# Patient Record
Sex: FEMALE | Race: WHITE | Employment: UNEMPLOYED | URBAN - METROPOLITAN AREA
[De-identification: names, ages, dates, MRNs, and addresses within clinical notes are randomized per-mention and may not be internally consistent; named-entity substitution may affect disease eponyms.]

---

## 2017-06-19 ENCOUNTER — OFFICE VISIT (OUTPATIENT)
Dept: FAMILY MEDICINE | Facility: CLINIC | Age: 27
End: 2017-06-19
Payer: COMMERCIAL

## 2017-06-19 VITALS
TEMPERATURE: 99.1 F | BODY MASS INDEX: 30.95 KG/M2 | DIASTOLIC BLOOD PRESSURE: 66 MMHG | HEIGHT: 65 IN | HEART RATE: 49 BPM | OXYGEN SATURATION: 100 % | SYSTOLIC BLOOD PRESSURE: 98 MMHG | WEIGHT: 185.75 LBS | RESPIRATION RATE: 16 BRPM

## 2017-06-19 DIAGNOSIS — M79.674 PAIN OF TOE OF RIGHT FOOT: ICD-10-CM

## 2017-06-19 DIAGNOSIS — R19.5 NONSPECIFIC ABNORMAL FINDING IN STOOL CONTENTS: ICD-10-CM

## 2017-06-19 DIAGNOSIS — J45.990 EXERCISE-INDUCED ASTHMA: ICD-10-CM

## 2017-06-19 DIAGNOSIS — Z13.0 SCREENING, ANEMIA, DEFICIENCY, IRON: ICD-10-CM

## 2017-06-19 DIAGNOSIS — Z01.419 WELL WOMAN EXAM WITH ROUTINE GYNECOLOGICAL EXAM: Primary | ICD-10-CM

## 2017-06-19 DIAGNOSIS — L67.8 ABNORMAL FACIAL HAIR: ICD-10-CM

## 2017-06-19 DIAGNOSIS — Z87.828 HISTORY OF SPRAIN OF ANKLE: ICD-10-CM

## 2017-06-19 DIAGNOSIS — Z13.6 ENCOUNTER FOR SCREENING FOR CARDIOVASCULAR DISORDERS: ICD-10-CM

## 2017-06-19 DIAGNOSIS — Z13.1 SCREENING FOR DIABETES MELLITUS: ICD-10-CM

## 2017-06-19 LAB
ALBUMIN SERPL-MCNC: 3.9 G/DL (ref 3.4–5)
ALP SERPL-CCNC: 58 U/L (ref 40–150)
ALT SERPL W P-5'-P-CCNC: 21 U/L (ref 0–50)
ANION GAP SERPL CALCULATED.3IONS-SCNC: 9 MMOL/L (ref 3–14)
AST SERPL W P-5'-P-CCNC: 16 U/L (ref 0–45)
BASOPHILS # BLD AUTO: 0 10E9/L (ref 0–0.2)
BASOPHILS NFR BLD AUTO: 0.4 %
BILIRUB SERPL-MCNC: 0.3 MG/DL (ref 0.2–1.3)
BUN SERPL-MCNC: 8 MG/DL (ref 7–30)
CALCIUM SERPL-MCNC: 9.2 MG/DL (ref 8.5–10.1)
CHLORIDE SERPL-SCNC: 107 MMOL/L (ref 94–109)
CHOLEST SERPL-MCNC: 208 MG/DL
CO2 SERPL-SCNC: 25 MMOL/L (ref 20–32)
CREAT SERPL-MCNC: 0.7 MG/DL (ref 0.52–1.04)
DIFFERENTIAL METHOD BLD: ABNORMAL
EOSINOPHIL # BLD AUTO: 0 10E9/L (ref 0–0.7)
EOSINOPHIL NFR BLD AUTO: 0.6 %
ERYTHROCYTE [DISTWIDTH] IN BLOOD BY AUTOMATED COUNT: 15.8 % (ref 10–15)
GFR SERPL CREATININE-BSD FRML MDRD: NORMAL ML/MIN/1.7M2
GLUCOSE SERPL-MCNC: 84 MG/DL (ref 70–99)
HCT VFR BLD AUTO: 39 % (ref 35–47)
HDLC SERPL-MCNC: 63 MG/DL
HGB BLD-MCNC: 12.6 G/DL (ref 11.7–15.7)
LDLC SERPL CALC-MCNC: 126 MG/DL
LYMPHOCYTES # BLD AUTO: 1.5 10E9/L (ref 0.8–5.3)
LYMPHOCYTES NFR BLD AUTO: 30.3 %
MCH RBC QN AUTO: 27.1 PG (ref 26.5–33)
MCHC RBC AUTO-ENTMCNC: 32.3 G/DL (ref 31.5–36.5)
MCV RBC AUTO: 84 FL (ref 78–100)
MONOCYTES # BLD AUTO: 0.5 10E9/L (ref 0–1.3)
MONOCYTES NFR BLD AUTO: 10.1 %
NEUTROPHILS # BLD AUTO: 2.8 10E9/L (ref 1.6–8.3)
NEUTROPHILS NFR BLD AUTO: 58.6 %
NONHDLC SERPL-MCNC: 145 MG/DL
PLATELET # BLD AUTO: 230 10E9/L (ref 150–450)
POTASSIUM SERPL-SCNC: 4.5 MMOL/L (ref 3.4–5.3)
PROT SERPL-MCNC: 7.1 G/DL (ref 6.8–8.8)
RBC # BLD AUTO: 4.65 10E12/L (ref 3.8–5.2)
SODIUM SERPL-SCNC: 141 MMOL/L (ref 133–144)
TRIGL SERPL-MCNC: 95 MG/DL
TSH SERPL DL<=0.005 MIU/L-ACNC: 1.08 MU/L (ref 0.4–4)
WBC # BLD AUTO: 4.9 10E9/L (ref 4–11)

## 2017-06-19 PROCEDURE — 99212 OFFICE O/P EST SF 10 MIN: CPT | Mod: 25 | Performed by: FAMILY MEDICINE

## 2017-06-19 PROCEDURE — 80061 LIPID PANEL: CPT | Performed by: FAMILY MEDICINE

## 2017-06-19 PROCEDURE — G0145 SCR C/V CYTO,THINLAYER,RESCR: HCPCS | Performed by: FAMILY MEDICINE

## 2017-06-19 PROCEDURE — 99385 PREV VISIT NEW AGE 18-39: CPT | Performed by: FAMILY MEDICINE

## 2017-06-19 PROCEDURE — 36415 COLL VENOUS BLD VENIPUNCTURE: CPT | Performed by: FAMILY MEDICINE

## 2017-06-19 PROCEDURE — 80050 GENERAL HEALTH PANEL: CPT | Performed by: FAMILY MEDICINE

## 2017-06-19 RX ORDER — ALBUTEROL SULFATE 90 UG/1
2 AEROSOL, METERED RESPIRATORY (INHALATION) EVERY 6 HOURS
COMMUNITY

## 2017-06-19 NOTE — PATIENT INSTRUCTIONS
"Breast exam done today   Pelvic pap  done , if normal recheck in 3 years  Labs today   Sign a release so we can update records  Will review what you have on file so far   If need albuterol refilled give us a call  Ice , rest right big toe, use stiff soled shoes, see podiatry if not better  Do stool test , if recurs or positive consider GI consult  No hemorrhoids seen  referral for Pt/ chiro for ankle sprain hx  Refilled vaniqa if not helping recommend to see dermatology   Recommended low salt diet, wt loss, exercise.   Eating a healthy diet can improve your health by reducing your risk for heart and vascular disease.  It can help you maintain a healthy weight which in turn decreases your risk for additional problems including diabetes and arthritis.  Eating well can improve your concentration and memory.  You'll also feel better.  Follow the advice of author Omar Alejandra (In Defense of Food): \"Eat food.  Not too much.  Mostly plants.\"  Or follow the advice:  \"If it came from a plant, eat it.  If it was processed in a plant, don't.\"    A heart-healthy, Mediterranean Diet is low in saturated fat and includes the following:  Fruits and vegetables (fresh or frozen - especially berries and cruciferous vegetables)  Whole grains and legumes (whole grain bread, whole grain pasta, whole grain cereal, and brown rice)  Healthy fats:  Olive oil and Canola oil  Nuts (especially walnuts and almonds)  Fish  Avocado  Soy  Garlic  Dark chocolate    Foods to limit or avoid include:  Animal products and animal fats  Saturated fat (especially fried foods and trans fats)  Refined carbohydrates (white flour, white bread, white pasta, sugar, and white rice)  Red meat  Processed meat  Processed food including fast food    OBIE SIGNUP FOR E-VISITS AND EASIER COMMUNICATION:  [email or web address]     Zipnosis:  [employer].  Sign up for e-visits for common illnesses!     RADIOLOGY:   [facility]:  [phone] " to schedule any radiology tests at [facility]: [phone]    Mammograms/colonoscopies:  [phone]    CONSUMER PRICE LINE for estimates of test costs:  [phone]     You can also call [phone] if you are uninsured or underinsured to see if you qualify for a reduced cost mammogram or colonoscopy.     Please consider using [facility] for better service and improved communication with your physician!       Preventive Health Recommendations  Female Ages 26 - 39  Yearly exam:   See your health care provider every year in order to    Review health changes.     Discuss preventive care.      Review your medicines if you your doctor has prescribed any.    Until age 30: Get a Pap test every three years (more often if you have had an abnormal result).    After age 30: Talk to your doctor about whether you should have a Pap test every 3 years or have a Pap test with HPV screening every 5 years.   You do not need a Pap test if your uterus was removed (hysterectomy) and you have not had cancer.  You should be tested each year for STDs (sexually transmitted diseases), if you're at risk.   Talk to your provider about how often to have your cholesterol checked.  If you are at risk for diabetes, you should have a diabetes test (fasting glucose).  Shots: Get a flu shot each year. Get a tetanus shot every 10 years.   Nutrition:     Eat at least 5 servings of fruits and vegetables each day.    Eat whole-grain bread, whole-wheat pasta and brown rice instead of white grains and rice.    Talk to your provider about Calcium and Vitamin D.     Lifestyle    Exercise at least 150 minutes a week (30 minutes a day, 5 days of the week). This will help you control your weight and prevent disease.    Limit alcohol to one drink per day.    No smoking.     Wear sunscreen to prevent skin cancer.    See your dentist every six months for an exam and cleaning.

## 2017-06-19 NOTE — NURSING NOTE
"Chief Complaint   Patient presents with     Physical     Initial BP 98/66  Pulse (!) 49  Temp 99.1  F (37.3  C) (Oral)  Resp 16  Ht 5' 5\" (1.651 m)  Wt 185 lb 12 oz (84.3 kg)  LMP 05/29/2017 (Exact Date)  SpO2 100%  BMI 30.91 kg/m2 Estimated body mass index is 30.91 kg/(m^2) as calculated from the following:    Height as of this encounter: 5' 5\" (1.651 m).    Weight as of this encounter: 185 lb 12 oz (84.3 kg)..  BP completed using cuff size: ameena Burnett MA   "

## 2017-06-19 NOTE — LETTER
June 30, 2017      Svetlana Weiss  [address] [facility] [address]    Dear ,      I am happy to inform you that your recent cervical cancer screening test (PAP smear) was normal.      Preventative screenings such as this help to ensure your health for years to come. You should repeat a pap smear in 3 years, unless otherwise directed.      You will still need to return to the clinic every year for your annual exam and other preventive tests.     Please contact the clinic at [phone] if you have further questions.       Sincerely,      Cyn Lisa MD/[facility]

## 2017-06-19 NOTE — LETTER
[facility]   [address] [place]   [address]  [phone]    June 20, 2017      Svetlana Weiss  [address] [facility] [address]              Dear Ms. Weiss,    Results within acceptable limits.  -Liver and gallbladder tests are normal. (ALT,AST, Alk phos, bilirubin), kidney function is normal (Cr, GFR), Sodium is normal, Potassium is normal, Calcium is normal, Glucose is normal (diabetes screening test).   -Cholesterol levels (LDL,HDL, Triglycerides) are normal.  ADVISE: rechecking in 1 year.  -TSH (thyroid stimulating hormone) level is normal which indicates normal thyroid function..    Results for orders placed or performed in visit on 06/19/17   CBC with platelets differential   Result Value Ref Range    WBC 4.9 4.0 - 11.0 10e9/L    RBC Count 4.65 3.8 - 5.2 10e12/L    Hemoglobin 12.6 11.7 - 15.7 g/dL    Hematocrit 39.0 35.0 - 47.0 %    MCV 84 78 - 100 fl    MCH 27.1 26.5 - 33.0 pg    MCHC 32.3 31.5 - 36.5 g/dL    RDW 15.8 (H) 10.0 - 15.0 %    Platelet Count 230 150 - 450 10e9/L    Diff Method Automated Method     % Neutrophils 58.6 %    % Lymphocytes 30.3 %    % Monocytes 10.1 %    % Eosinophils 0.6 %    % Basophils 0.4 %    Absolute Neutrophil 2.8 1.6 - 8.3 10e9/L    Absolute Lymphocytes 1.5 0.8 - 5.3 10e9/L    Absolute Monocytes 0.5 0.0 - 1.3 10e9/L    Absolute Eosinophils 0.0 0.0 - 0.7 10e9/L    Absolute Basophils 0.0 0.0 - 0.2 10e9/L   Comprehensive metabolic panel   Result Value Ref Range    Sodium 141 133 - 144 mmol/L    Potassium 4.5 3.4 - 5.3 mmol/L    Chloride 107 94 - 109 mmol/L    Carbon Dioxide 25 20 - 32 mmol/L    Anion Gap 9 3 - 14 mmol/L    Glucose 84 70 - 99 mg/dL    Urea Nitrogen 8 7 - 30 mg/dL    Creatinine 0.70 0.52 - 1.04 mg/dL    GFR Estimate >90  Non  GFR Calc   >60 mL/min/1.7m2    GFR Estimate If Black >90   GFR Calc   >60 mL/min/1.7m2    Calcium 9.2 8.5 - 10.1 mg/dL    Bilirubin Total 0.3 0.2 - 1.3 mg/dL    Albumin 3.9  3.4 - 5.0 g/dL    Protein Total 7.1 6.8 - 8.8 g/dL    Alkaline Phosphatase 58 40 - 150 U/L    ALT 21 0 - 50 U/L    AST 16 0 - 45 U/L   Lipid panel reflex to direct LDL   Result Value Ref Range    Cholesterol 208 (H) <200 mg/dL    Triglycerides 95 <150 mg/dL    HDL Cholesterol 63 >49 mg/dL    LDL Cholesterol Calculated 126 (H) <100 mg/dL    Non HDL Cholesterol 145 (H) <130 mg/dL   TSH with free T4 reflex   Result Value Ref Range    TSH 1.08 0.40 - 4.00 mU/L         Sincerely,    Cyn Lisa MD/nr

## 2017-06-19 NOTE — PROGRESS NOTES
Results within acceptable limits.  -Normal red blood cell (hgb) levels, normal white blood cell count and normal platelet levels. Rest pending .

## 2017-06-19 NOTE — MR AVS SNAPSHOT
"              After Visit Summary   6/19/2017    Svetlana Weiss    MRN: [id number]           Patient Information     Date Of Birth          1990        Visit Information        Provider Department      6/19/2017 8:00 AM Cyn Lisa MD [facility]        Today's Diagnoses     Well woman exam with routine gynecological exam    -  1    Screening, anemia, deficiency, iron        Screening for diabetes mellitus        Encounter for screening for cardiovascular disorders        BMI 30.0-30.9,adult        Pain of toe of right foot        Nonspecific abnormal finding in stool contents        History of sprain of ankle        Abnormal facial hair          Care Instructions    Breast exam done today   Pelvic pap  done , if normal recheck in 3 years  Labs today   Sign a release so we can update records  Will review what you have on file so far   If need albuterol refilled give us a call  Ice , rest right big toe, use stiff soled shoes, see podiatry if not better  Do stool test , if recurs or positive consider GI consult  No hemorrhoids seen  referral for Pt/ chiro for ankle sprain hx  Refilled vaniqa if not helping recommend to see dermatology   Recommended low salt diet, wt loss, exercise.   Eating a healthy diet can improve your health by reducing your risk for heart and vascular disease.  It can help you maintain a healthy weight which in turn decreases your risk for additional problems including diabetes and arthritis.  Eating well can improve your concentration and memory.  You'll also feel better.  Follow the advice of author Omar Alejandra (In Defense of Food): \"Eat food.  Not too much.  Mostly plants.\"  Or follow the advice:  \"If it came from a plant, eat it.  If it was processed in a plant, don't.\"    A heart-healthy, Mediterranean Diet is low in saturated fat and includes the following:  Fruits and vegetables (fresh or frozen - especially berries and cruciferous vegetables)  Whole grains and " legumes (whole grain bread, whole grain pasta, whole grain cereal, and brown rice)  Healthy fats:  Olive oil and Canola oil  Nuts (especially walnuts and almonds)  Fish  Avocado  Soy  Garlic  Dark chocolate    Foods to limit or avoid include:  Animal products and animal fats  Saturated fat (especially fried foods and trans fats)  Refined carbohydrates (white flour, white bread, white pasta, sugar, and white rice)  Red meat  Processed meat  Processed food including fast food    JUAREZHART SIGNUP FOR E-VISITS AND EASIER COMMUNICATION:  [email or web address]     Zipnosis:  [employer].  Sign up for e-visits for common illnesses!     RADIOLOGY:   [facility]:  [phone] to schedule [facility] radi[facility] tests at [facility]: [phone]    Mammograms/colonoscopies:  [phone]    CONSUMER PRICE LINE for estimates of test costs:  [phone]     You can also call [phone] if you are uninsured or underinsured to see if you qualify for a reduced cost mammogram or colonoscopy.     Please consider using [facility] for better service and improved communication with your physician!       Preventive Health Recommendations  Female Ages 26 - 39  Yearly exam:   See your health care provider every year in order to    Review health changes.     Discuss preventive care.      Review your medicines if you your doctor has prescribed any.    Until age 30: Get a Pap test every three years (more often if you have had an abnormal result).    After age 30: Talk to your doctor about whether you should have a Pap test every 3 years or have a Pap test with HPV screening every 5 years.   You do not need a Pap test if your uterus was removed (hysterectomy) and you have not had cancer.  You should be tested each year for STDs (sexually transmitted diseases), if you're at risk.   Talk to your provider about how often to have your cholesterol checked.  If you are at risk for diabetes, you should have  a diabetes test (fasting glucose).  Shots: Get a flu shot each year. Get a tetanus shot every 10 years.   Nutrition:     Eat at least 5 servings of fruits and vegetables each day.    Eat whole-grain bread, whole-wheat pasta and brown rice instead of white grains and rice.    Talk to your provider about Calcium and Vitamin D.     Lifestyle    Exercise at least 150 minutes a week (30 minutes a day, 5 days of the week). This will help you control your weight and prevent disease.    Limit alcohol to one drink per day.    No smoking.     Wear sunscreen to prevent skin cancer.    See your dentist every six months for an exam and cleaning.            Follow-ups after your visit        Additional Services     [facility] PT, HAND, AND CHIROPRACTIC REFERRAL       **This order will print in the [facility] Scheduling Office**    Physical Therapy, Hand Therapy and Chiropractic Care are available through:    *[facility]  *[facility]  *[facility]    Call one number to schedule at any of the above locations: [phone].    Your provider has referred you to: [facility] - PT and/or Chiropractic Care determined by clinical presentation at [facility] or [facility] Initial Visit    Indication/Reason for Referral: Ankle Pain  Onset of Illness: hx of recurrent ankle sprains.   Therapy Orders: Evaluate and Treat  Special Programs: None  Special Request: None    Betty Hernández      Additional Comments for the Therapist or Chiropractor:     Please be aware that coverage of these services is subject to the terms and limitations of your health insurance plan.  Call member services at your health plan with any benefit or coverage questions.      Please bring the following to your appointment:    *Your personal calendar for scheduling future appointments  *Comfortable clothing                  Future tests that were ordered for you today     Open Future Orders        Priority Expected Expires  "Ordered    Fecal colorectal cancer screen (FIT) Routine 7/10/2017 2017 2017            Who to contact     If you have questions or need follow up information about today's clinic visit or your schedule please contact [facility] directly at [phone].  Normal or non-critical lab and imaging results will be communicated to you by MyChart, letter or phone within 4 business days after the clinic has received the results. If you do not hear from us within 7 days, please contact the clinic through MyChart or phone. If you have a critical or abnormal lab result, we will notify you by phone as soon as possible.  Submit refill requests through Tr[employer]o or call your pharmacy and they will forward the refill request to us. Please allow 3 business days for your refill to be completed.          Additional Information About Your Visit        MyChart Information     Tr[employer]o lets you send messages to your doctor, view your test results, renew your prescriptions, schedule appointments and more. To sign up, go to [email or web address] . Click on \"Log in\" on the left side of the screen, which will take you to the Welcome page. Then click on \"Sign up Now\" on the right side of the page.     You will be asked to enter the access code listed below, as well as some personal information. Please follow the directions to create your username and password.     Your access code is: [email or web address]  Expires: 2017  8:42 AM     Your access code will  in 90 days. If you need help or a new code, please call your [place] clinic or [phone].        Care EveryWhere ID     This is your Care EveryWhere ID. This could be used by other organizations to access your [place] medical records  [id number]        Your Vitals Were     Pulse Temperature Respirations Height Last Period Pulse Oximetry    49 99.1  F (37.3  C) (Oral) 16 5' 5\" (1.651 m) 2017 (Exact Date) 100%    BMI (Body Mass Index)                   " 30.91 kg/m2            Blood Pressure from Last 3 Encounters:   06/19/17 98/66    Weight from Last 3 Encounters:   06/19/17 185 lb 12 oz (84.3 kg)              We Performed the Following     CBC with platelets differential     Comprehensive metabolic panel     [facility] PT, HAND, AND CHIROPRACTIC REFERRAL     Lipid panel reflex to direct LDL     Pap imaged thin layer screen reflex to HPV if ASCUS - recommend age 25 - 29     TSH with free T4 reflex          Where to get your medicines      [facility] [place] [facility] [address] [facility] [address]  [address] [facility] [address], [facility] [address]     Phone:  [phone]     eflornithine HCl 13.9 % Crea          Primary Care Provider Office Phone # Fax #    Cyn Lisa [phone]       [facility] [address]  [facility] [address]        Thank you!     Thank you for choosing [facility]  for your care. Our goal is always to provide you with excellent care. Hearing back from our patients is one way we can continue to improve our services. Please take a few minutes to complete the written survey that you may receive in the mail after your visit with us. Thank you!             Your Updated Medication List - Protect others around you: Learn how to safely use, store and throw away your medicines at www.disposemymeds.org.          This list is accurate as of: 6/19/17  8:42 AM.  Always use your most recent med list.                   Brand Name Dispense Instructions for use    albuterol 108 (90 BASE) MCG/ACT Inhaler    PROAIR HFA/PROVENTIL HFA/VENTOLIN HFA     Inhale 2 puffs into the lungs every 6 hours       eflornithine HCl 13.9 % Crea    VANIQA    45 g    Externally apply 1 Application topically 2 times daily

## 2017-06-19 NOTE — PROGRESS NOTES
Results within acceptable limits.  -Liver and gallbladder tests are normal. (ALT,AST, Alk phos, bilirubin), kidney function is normal (Cr, GFR), Sodium is normal, Potassium is normal, Calcium is normal, Glucose is normal (diabetes screening test).   -Cholesterol levels (LDL,HDL, Triglycerides) are normal.  ADVISE: rechecking in 1 year.  -TSH (thyroid stimulating hormone) level is normal which indicates normal thyroid function..

## 2017-06-19 NOTE — PROGRESS NOTES
SUBJECTIVE:     CC: Svetlana Weiss is an 27 year old woman who presents for preventive health visit.     Healthy Habits:  Answers for HPI/ROS submitted by the patient on 6/19/2017   Annual Exam:  Getting at least 3 servings of Calcium per day:: Yes  Bi-annual eye exam:: NO  Dental care twice a year:: NO  Sleep apnea or symptoms of sleep apnea:: None  Diet:: Regular (no restrictions), Breakfast skipped  Frequency of exercise:: 2-3 days/week  Taking medications regularly:: Not Applicable  Medication side effects:: Not applicable  Additional concerns today:: YES  PHQ-2 Score: 0  Duration of exercise:: 15-30 minutes        Doctoring at [facility] then [facility]. Reviewed care every briefly and will review records brought in form essential later today in more detail   Moved oct 2016 from [facility].  vaniqa prescription given in high school for unwanted hair, decreased amount  Of hair, no No side effects. Desires refill     Pain under right big toe came after hiking may 20th . Usually if wear flip flops or running or stand on it wrong.     June 11th , noted blood in stool. On top of stool. No prior or subsequent episodes, no known hemorrhoids.     occasional acid reflux not an issue     Reports sprains ankle once a year or every other yr. Insurance covers chiropractor. No boot use, tapes pretty frequently . Would like to see chiropractor    Never sexually active never been. Never had pap before   Declines need for std testing     Today's PHQ-2 Score:   PHQ-2 ( 1999 Pfizer) 6/19/2017   Q1: Little interest or pleasure in doing things 0   Q2: Feeling down, depressed or hopeless 0   PHQ-2 Score 0   Q1: Little interest or pleasure in doing things Not at all   Q2: Feeling down, depressed or hopeless Not at all   PHQ-2 Score 0       Abuse: Current or Past(Physical, Sexual or Emotional)- No  Do you feel safe in your environment - Yes    Social History   Substance Use Topics     Smoking status: Never  Smoker     Smokeless tobacco: Not on file     Alcohol use Yes      Comment: 1 to 2 drinks per week / on occasion      The patient does not drink >3 drinks per day nor >7 drinks per week.    No results for input(s): CHOL, HDL, LDL, TRIG, CHOLHDLRATIO, NHDL in the last 82489 hours.    Reviewed orders with patient.  Reviewed health maintenance and updated orders accordingly - Yes    Mammo Decision Support:  Mammogram not appropriate for this patient based on age.    Pertinent mammograms are reviewed under the imaging tab.  History of abnormal Pap smear: NO - age 30- 65 PAP every 3 years recommended  Last 3 Pap Results: No results found for: PAP    Reviewed and updated as needed this visit by clinical staff  Tobacco  Allergies  Meds  Med Hx  Surg Hx  Fam Hx  Soc Hx        Reviewed and updated as needed this visit by Provider        Past Medical History:   Diagnosis Date     Exercise-induced asthma       Past Surgical History:   Procedure Laterality Date     HC TOOTH EXTRACTION W/FORCEP       MYRINGOTOMY, INSERT TUBE BILATERAL, COMBINED       Obstetric History       T0      L0     SAB0   TAB0   Ectopic0   Multiple0   Live Births0           ROS:  C: NEGATIVE for fever, chills, change in weight  I: NEGATIVE for worrisome rashes, moles or lesions  E: NEGATIVE for vision changes or irritation  ENT: NEGATIVE for ear, mouth and throat problems  R: NEGATIVE for significant cough or SOB  B: NEGATIVE for masses, tenderness or discharge  CV: NEGATIVE for chest pain, palpitations or peripheral edema  GI: NEGATIVE for nausea, abdominal pain, heartburn, or change in bowel habits  : NEGATIVE for unusual urinary or vaginal symptoms. Periods are regular.  M: NEGATIVE for significant arthralgias or myalgia  N: NEGATIVE for weakness, dizziness or paresthesias  E: NEGATIVE for temperature intolerance, skin/hair changes  H: NEGATIVE for bleeding problems  P: NEGATIVE for changes in mood or affect    Problem list,  "Medication list, Allergies, and Medical/Social/Surgical histories reviewed in [facility] and updated as appropriate.  Labs reviewed in EPIC  BP Readings from Last 3 Encounters:   06/19/17 98/66    Wt Readings from Last 3 Encounters:   06/19/17 185 lb 12 oz (84.3 kg)                  Patient Active Problem List   Diagnosis     BMI 30.0-30.9,adult     Exercise-induced asthma     Past Surgical History:   Procedure Laterality Date     HC TOOTH EXTRACTION W/FORCEP       MYRINGOTOMY, INSERT TUBE BILATERAL, COMBINED         Social History   Substance Use Topics     Smoking status: Never Smoker     Smokeless tobacco: Never Used     Alcohol use Yes      Comment: 1 to 2 drinks per week / on occasion      Family History   Problem Relation Age of Onset     DIABETES Brother      type 1 on insulin pump          Current Outpatient Prescriptions   Medication Sig Dispense Refill     albuterol (PROAIR HFA/PROVENTIL HFA/VENTOLIN HFA) 108 (90 BASE) MCG/ACT Inhaler Inhale 2 puffs into the lungs every 6 hours       eflornithine HCl (VANIQA) 13.9 % CREA Externally apply 1 Application topically 2 times daily 45 g 1     No Known Allergies  No lab results found.   OBJECTIVE:     BP 98/66  Pulse (!) 49  Temp 99.1  F (37.3  C) (Oral)  Resp 16  Ht 5' 5\" (1.651 m)  Wt 185 lb 12 oz (84.3 kg)  LMP 05/29/2017 (Exact Date)  SpO2 100%  BMI 30.91 kg/m2  EXAM:  GENERAL: healthy, alert and no distress  EYES: Eyes grossly normal to inspection, PERRL and conjunctivae and sclerae normal  HENT: ear canals and TM's normal, nose and mouth without ulcers or lesions  NECK: no adenopathy, no asymmetry, masses, or scars and thyroid normal to palpation  RESP: lungs clear to auscultation - no rales, rhonchi or wheezes  BREAST: normal without masses, tenderness or nipple discharge and no palpable axillary masses or adenopathy  CV: regular rate and rhythm, normal S1 S2, no S3 or S4, no murmur, click or rub, no peripheral edema and peripheral pulses strong  ABDOMEN: " soft, nontender, no hepatosplenomegaly, no masses and bowel sounds normal   (female): normal female external genitalia, normal urethral meatus, vaginal mucosa pink, moist, well rugated, and normal cervix/adnexa/uterus without masses or discharge  MS: no gross musculoskeletal defects noted, no edema  SKIN: no suspicious lesions or rashes  NEURO: Normal strength and tone, mentation intact and speech normal  PSYCH: mentation appears normal, affect normal/bright  LYMPH: no cervical, supraclavicular, axillary, or inguinal adenopathy    ASSESSMENT/PLAN:     1. Well woman exam with routine gynecological exam  New , no records, limited apt time, [place]  neg.here for wellwoman physical. Reviewed care every where  [facility] peds and derm . Reviewed briefly [facility] printed records brought in form 2012 to 2015.   - CBC with platelets differential. Will update chart when have time later today.   Breast exam done today. First Pelvic pap  done , if normal recheck in 3 years. Labs today. No indication to check for STD's. Sign a release so we can update records if need more records. Gave her a JEFFREY form to hang on to. Will review what she has given us so far.  Stable exercise induced asthma. Declines albuterol, reports not use din years. If needs albuterol refill will give us a call. Ice , rest right big toe, use stiff soled shoes, see podiatry if not better. Do stool test , if recurs or positive or if keeps happening consider GI consult. No hemorrhoids seen. referral for Pt/ chiro for ankle sprain hx. Refilled vaniqa if not helping recommend to see dermatology. Recommended low salt diet, wt loss, exercise. Tdap utd 6/8/09  Eating a healthy diet can improve your health by reducing your risk for heart and vascular disease.  It can help you maintain a healthy weight which in turn decreases your risk for additional problems including diabetes and arthritis.  Eating well can improve your concentration and memory.  You'll  "also feel better.  Follow the advice of author Omar Alejandra (In Defense of Food): \"Eat food.  Not too much.  Mostly plants.\"  Or follow the advice:  \"If it came from a plant, eat it.  If it was processed in a plant, don't.\"    A heart-healthy, Mediterranean Diet is low in saturated fat and includes the following:  Fruits and vegetables (fresh or frozen - especially berries and cruciferous vegetables)  Whole grains and legumes (whole grain bread, whole grain pasta, whole grain cereal, and brown rice)  Healthy fats:  Olive oil and Canola oil  Nuts (especially walnuts and almonds)  Fish  Avocado  Soy  Garlic  Dark chocolate    Foods to limit or avoid include:  Animal products and animal fats  Saturated fat (especially fried foods and trans fats)  Refined carbohydrates (white flour, white bread, white pasta, sugar, and white rice)  Red meat  Processed meat  Processed food including fast food    MYCHART SIGNUP FOR E-VISITS AND EASIER COMMUNICATION:  [email or web address]     Zipnosis:  Health-Connected.Squirrly.  Sign up for e-visits for common illnesses!     RADIOLOGY:   [facility]:  [phone] to schedule any radiology tests at [facility]: [phone]    Mammograms/colonoscopies:  [phone]    CONSUMER PRICE LINE for estimates of test costs:  [phone]     You can also call [phone] if you are uninsured or underinsured to see if you qualify for a reduced cost mammogram or colonoscopy.     Please consider using [facility] for better service and improved communication with your physician!    Preventive Health Recommendations  Female Ages 26 - 39  Yearly exam:   See your health care provider every year in order to    Review health changes.     Discuss preventive care.      Review your medicines if you your doctor has prescribed any.    Until age 30: Get a Pap test every three years (more often if you have had an abnormal result).    After age 30: Talk to your doctor " about whether you should have a Pap test every 3 years or have a Pap test with HPV screening every 5 years.   You do not need a Pap test if your uterus was removed (hysterectomy) and you have not had cancer.  You should be tested each year for STDs (sexually transmitted diseases), if you're at risk.   Talk to your provider about how often to have your cholesterol checked.  If you are at risk for diabetes, you should have a diabetes test (fasting glucose).  Shots: Get a flu shot each year. Get a tetanus shot every 10 years.   Nutrition:     Eat at least 5 servings of fruits and vegetables each day.    Eat whole-grain bread, whole-wheat pasta and brown rice instead of white grains and rice.    Talk to your provider about Calcium and Vitamin D.     Lifestyle    Exercise at least 150 minutes a week (30 minutes a day, 5 days of the week). This will help you control your weight and prevent disease.    Limit alcohol to one drink per day.    No smoking.     Wear sunscreen to prevent skin cancer.    See your dentist every six months for an exam and cleaning.  - Comprehensive metabolic panel  - Lipid panel reflex to direct LDL  - Pap imaged thin layer screen reflex to HPV if ASCUS - recommend age 25 - 29    2. Screening, anemia, deficiency, iron  - CBC with platelets differential    3. Screening for diabetes mellitus  - Comprehensive metabolic panel    4. Encounter for screening for cardiovascular disorders  - Lipid panel reflex to direct LDL    5. BMI 30.0-30.9,adult  - TSH with free T4 reflex    6. Pain of toe of right foot  Ice , rest right big toe, use stiff soled shoes, see podiatry if not better.    7. Nonspecific abnormal finding in stool contents  GI if recurs or positive blood in stool again   - Fecal colorectal cancer screen (FIT); Future    8. History of sprain of ankle  - [facility] PT, HAND, AND CHIROPRACTIC REFERRAL    9. Abnormal facial hair  - eflornithine HCl (VANIQA) 13.9 % CREA; Externally apply 1 Application  "topically 2 times daily  Dispense: 45 g; Refill: 1  Derm if not helping   0. exercise induced asthma: Stable on no meds   COUNSELING:   Reviewed preventive health counseling, as reflected in patient instructions       Regular exercise       Healthy diet/nutrition       Vision screening         reports that she has never smoked. She does not have any smokeless tobacco history on file.    Estimated body mass index is 30.91 kg/(m^2) as calculated from the following:    Height as of this encounter: 5' 5\" (1.651 m).    Weight as of this encounter: 185 lb 12 oz (84.3 kg).   Weight management plan: Discussed healthy diet and exercise guidelines and patient will follow up in 12 months in clinic to re-evaluate.    Counseling Resources:  ATP IV Guidelines  Pooled Cohorts Equation Calculator  Breast Cancer Risk Calculator  FRAX Risk Assessment  ICSI Preventive Guidelines  Dietary Guidelines for Americans, 2010  USDA's MyPlate  ASA Prophylaxis  Lung CA Screening    Cyn Lisa MD  [facility]  "

## 2017-06-19 NOTE — LETTER
[facility]   [address] [place]   [address]  [phone]    June 20, 2017      Svetlana Weiss  [address] [facility] [address]              Dear Ms. Weiss,    Results within acceptable limits.  -Normal red blood cell (hgb) levels, normal white blood cell count and normal platelet levels. Rest pending .    Results for orders placed or performed in visit on 06/19/17   CBC with platelets differential   Result Value Ref Range    WBC 4.9 4.0 - 11.0 10e9/L    RBC Count 4.65 3.8 - 5.2 10e12/L    Hemoglobin 12.6 11.7 - 15.7 g/dL    Hematocrit 39.0 35.0 - 47.0 %    MCV 84 78 - 100 fl    MCH 27.1 26.5 - 33.0 pg    MCHC 32.3 31.5 - 36.5 g/dL    RDW 15.8 (H) 10.0 - 15.0 %    Platelet Count 230 150 - 450 10e9/L    Diff Method Automated Method     % Neutrophils 58.6 %    % Lymphocytes 30.3 %    % Monocytes 10.1 %    % Eosinophils 0.6 %    % Basophils 0.4 %    Absolute Neutrophil 2.8 1.6 - 8.3 10e9/L    Absolute Lymphocytes 1.5 0.8 - 5.3 10e9/L    Absolute Monocytes 0.5 0.0 - 1.3 10e9/L    Absolute Eosinophils 0.0 0.0 - 0.7 10e9/L    Absolute Basophils 0.0 0.0 - 0.2 10e9/L   Comprehensive metabolic panel   Result Value Ref Range    Sodium 141 133 - 144 mmol/L    Potassium 4.5 3.4 - 5.3 mmol/L    Chloride 107 94 - 109 mmol/L    Carbon Dioxide 25 20 - 32 mmol/L    Anion Gap 9 3 - 14 mmol/L    Glucose 84 70 - 99 mg/dL    Urea Nitrogen 8 7 - 30 mg/dL    Creatinine 0.70 0.52 - 1.04 mg/dL    GFR Estimate >90  Non  GFR Calc   >60 mL/min/1.7m2    GFR Estimate If Black >90   GFR Calc   >60 mL/min/1.7m2    Calcium 9.2 8.5 - 10.1 mg/dL    Bilirubin Total 0.3 0.2 - 1.3 mg/dL    Albumin 3.9 3.4 - 5.0 g/dL    Protein Total 7.1 6.8 - 8.8 g/dL    Alkaline Phosphatase 58 40 - 150 U/L    ALT 21 0 - 50 U/L    AST 16 0 - 45 U/L   Lipid panel reflex to direct LDL   Result Value Ref Range    Cholesterol 208 (H) <200 mg/dL    Triglycerides 95 <150 mg/dL    HDL Cholesterol 63 >49  mg/dL    LDL Cholesterol Calculated 126 (H) <100 mg/dL    Non HDL Cholesterol 145 (H) <130 mg/dL   TSH with free T4 reflex   Result Value Ref Range    TSH 1.08 0.40 - 4.00 mU/L         Sincerely,    Cyn Lisa MD/nr

## 2017-06-20 LAB
COPATH REPORT: NORMAL
PAP: NORMAL

## 2017-06-20 ASSESSMENT — ASTHMA QUESTIONNAIRES: ACT_TOTALSCORE: 25

## 2017-06-27 PROCEDURE — 82274 ASSAY TEST FOR BLOOD FECAL: CPT | Performed by: FAMILY MEDICINE

## 2017-06-30 DIAGNOSIS — R19.5 NONSPECIFIC ABNORMAL FINDING IN STOOL CONTENTS: ICD-10-CM

## 2017-06-30 LAB — HEMOCCULT STL QL IA: NEGATIVE

## 2017-06-30 NOTE — LETTER
[facility]   [address] [place]   [address]  [phone]    June 30, 2017      Svetlana Weiss  [address] [facility] [address]              Dear Ms. Weiss,    Results within acceptable limits.  -FIT test (screening test for colon cancer) was normal.    Results for orders placed or performed in visit on 06/30/17   Fecal colorectal cancer screen (FIT)   Result Value Ref Range    Occult Blood Scn FIT Negative NEG           Sincerely,    Cyn Lisa MD/nr

## 2020-08-04 ENCOUNTER — THERAPY VISIT (OUTPATIENT)
Dept: PHYSICAL THERAPY | Facility: CLINIC | Age: 30
End: 2020-08-04
Payer: COMMERCIAL

## 2020-08-04 DIAGNOSIS — G89.29 CHRONIC PAIN OF RIGHT ANKLE: ICD-10-CM

## 2020-08-04 DIAGNOSIS — M25.571 CHRONIC PAIN OF RIGHT ANKLE: ICD-10-CM

## 2020-08-04 PROCEDURE — 97161 PT EVAL LOW COMPLEX 20 MIN: CPT | Mod: GP | Performed by: PHYSICAL THERAPIST

## 2020-08-04 PROCEDURE — 97110 THERAPEUTIC EXERCISES: CPT | Mod: GP | Performed by: PHYSICAL THERAPIST

## 2020-08-04 NOTE — PROGRESS NOTES
[facility] Initial Evaluation -- Lower Extremity    Evaluation Date: August 4, 2020  Svetlana Weiss is a 30 year old female with a R ankle condition.   Referral: podiatry  Work mechanical stresses: none none   Employment status: none  Leisure mechanical stresses: tennis, volleyball, golf, pickleball  Functional disability score:   VAS score (0-10): 2/10  Patient goals/expectations:  To have my ankle be more stable and not worry about turning it with tennis, volleyball, pickleball.    HISTORY:    Present symptoms: R anterior ankle pain, posterior ankle, R arch discomfort, numbness   Pain quality (sharp/shooting/stabbing/aching/burning/cramping):  Aching     Present since (onset date): 2006 ankle sprain during volleyball.  Has had 1 sprain/year since--always turns ankle out.  Most recent sprain was 10/04/2020 when she just stepped wrong while in her kitchen.    Symptoms (improving/unchaning/worsening):  improving.      Symptoms commenced as a result of: most recent with stepping wrong in her kitchen  Condition occurred in the following environment:      Symptoms at onset: R lateral ankle pain  Paresthesia (yes/no):  Yes, lateral foot if inverted too long  Spinal history: none   Cough/Sneeze (pos/neg):  na    Constant symptoms: none  Intermittent symptoms: R anterior ankle, posterior ankle, R arch discomfort, lateral ankle numbness    Symptoms are worse with the following: playing sports--pickleball, tennis--feels like ankle will give out, walking on uneven ground  Symptoms are better with the following: rest    Continued use makes the pain (better/worse/no effect): NE    Disturbed night (yes/no): no      Pain at rest (yes/no):  no  Site (back/hip/knee/ankle/foot):  na    Other questions (swelling/clicking/locking/giving way/falling):  unsteady   Has a history of B knee and hip pain  Previous episodes: 1 ankle sprain/year since 2006  Previous treatments: PT in 2006--not helpful    Specific  Questions:  General health (excellent/good/fair/poor):  good  Pertinent medical history includes: Migraines/Headaches and Overweight  Medications (nil/NSAIDS/analg/steroids/anticoag/other):  None  Medical allergies:  none  Imaging (none/Xray/MRI/other):  none  Recent or major surgery (yes/no):  no  Night pain (yes/no):  no  Accidents (yes/no):  no  Unexplained weight loss (yes/no):  no  Barriers at home: no  Other red flags: no    Sites for physical examination (back/hip/knee/ankle/foot/other): R ankle    EXAMINATION    Posture:  Sitting (good/fair/poor):     Correction of Posture (better/worse/no effect/NA):   Standing (good/fair/poor): Other observations:      Neurological: (NA/motor/sensory/reflexes/dural):     Baselines (pain or functional activity): R ankle stiffness with full squat, R ankle pain with single-leg squat, less stability R ankle SLS    Extremities (Hip / Knee / Ankle / Foot): R ankle      Movement Loss Carlos Mod Min Nil Pain   Flexion        Extension        Abduction        Adduction        Internal Rotation        External Rotation        Dorsiflexion    x NE   Plantarflexion    x NE   Inversion    x NE   Eversion    x NE     Passive Movement (+/- over pressure)/(PDM/ERP):  Hypermobile all directions B ankles, no pain overpressures R  Resisted Test Response (pain): 5/5 MMT throughout R ankle, no pain  Other Tests: increased laxity lateral ligaments with anterior drawer    Spine:  Movement loss:   Effect of repeated movements:   Effect of static positioning:   Spine testing (not relevant/relevant/secondary problem):     Baseline Symptoms: R ankle pain with single-leg squat, unsteady SLS  Repeated Tests Symptom Response Mechanical Response   Active/Passive movement, resisted test, functional test During -  Produce, Abolish, Increase, Decrease, NE After -  Better, Worse, NB, NW, NE Effect -   ? or ? ROM, strength or key functional test No   Effect   Repeated R ankle DF with belt OP No Effect     Slightly better? Less stiffness and pain squat    Repeated R ankle DF loaded No Effect    Slightly better Less pain and stiffness squat, improved stability SLS                  Effect of static positioning                Provisional Classification (Extremity/Spine):  Extremity - Derangement and Inconclusive/Other - Structurally Compromised      Princicple of Management:   Education:  POC, treatment rationale, expected response    Equipment provided:  none  Exercise and dosage:  Repeated R ankle DF loaded, 10 reps, 3-4x/day, increase to 5-6x/day as able    ASSESSMENT/PLAN:    Patient is a 30 year old female with right side ankle complaints.  Assessing for derangement.  She has good ROM and no pain with movement.  Strength is good with MMT, but she has weakness noted with SLS.  She had decreased pain and stiffness after repeated DF in a loaded position and will try at home to assess further.  Treatment will focus on directional preference exercises if able to establish in addition to strength and proprioception work to improve stability and overall function.   Patient has the following significant findings with corresponding treatment plan.                Diagnosis 1:  Chronic R ankle pain  Pain -  self management, education, directional preference exercise and home program  Impaired balance - neuro re-education, therapeutic activities and home program  Decreased proprioception - neuro re-education, therapeutic activities and home program  Decreased function - therapeutic activities and home program      Cumulative Therapy Evaluation is: Low complexity    Previous and current functional limitations:  (See Goal Flow Sheet for this information)    Short term and Long term goals: (See Goal Flow Sheet for this information)     Communication ability:  Patient appears to be able to clearly communicate and understand verbal and written communication and follow directions correctly.  Treatment Explanation - The following has  been discussed with the patient:   RX ordered/plan of care  Anticipated outcomes  Possible risks and side effects  This patient would benefit from PT intervention to resume normal activities.   Rehab potential is good.    Frequency:  1 X week, once daily  Duration:  for 6 weeks  Discharge Plan:  Achieve all LTG.  Independent in home treatment program.  Reach maximal therapeutic benefit.    Please refer to the daily flowsheet for treatment today, total treatment time and time spent performing 1:1 timed codes.

## 2020-08-04 NOTE — LETTER
[facility]  [address] [place] [address]  [address]  [facility] [id number] [phone]    2020    Re: Svetlana Weiss   :   1990  MRN:  [id number]   REFERRING PHYSICIAN:   Bailey Art    [facility]    Date of Initial Evaluation:  20  Visits:  Rxs Used: 1  Reason for Referral:  Chronic pain of right ankle    EVALUATION SUMMARY    [facility] Initial Evaluation -- Lower Extremity  Evaluation Date: 2020  Svetlana Weiss is a 30 year old female with a R ankle condition.   Referral: podiatry  Work mechanical stresses: none none   Employment status: none  Leisure mechanical stresses: tennis, volleyball, golf, pickleball  Functional disability score:   VAS score (0-10): 2/10  Patient goals/expectations:  To have my ankle be more stable and not worry about turning it with tennis, volleyball, pickleball.    HISTORY:  Present symptoms: R anterior ankle pain, posterior ankle, R arch discomfort, numbness   Pain quality (sharp/shooting/stabbing/aching/burning/cramping):  Aching   Present since (onset date):  ankle sprain during volleyball.  Has had 1 sprain/year since--always turns ankle out.  Most recent sprain was 10/04/2020 when she just stepped wrong while in her kitchen.    Symptoms (improving/unchaning/worsening):  improving.    Symptoms commenced as a result of: most recent with stepping wrong in her kitchen  Condition occurred in the following environment:    Symptoms at onset: R lateral ankle pain  Paresthesia (yes/no):  Yes, lateral foot if inverted too long  Spinal history: none   Cough/Sneeze (pos/neg):  na  Constant symptoms: none  Intermittent symptoms: R anterior ankle, posterior ankle, R arch discomfort, lateral ankle numbness  Symptoms are worse with the following: playing sports--pickleball, tennis--feels like ankle will give out,  walking on uneven ground  Symptoms are better with the following: rest  Continued use makes the pain (better/worse/no effect): NE  Disturbed night (yes/no): no    Pain at rest (yes/no):  no  Site (back/hip/knee/ankle/foot):  na  Other questions (swelling/clicking/locking/giving way/falling):  unsteady   Has a history of B knee and hip pain  Previous episodes: 1 ankle sprain/year since 2006  Previous treatments: PT in 2006--not helpful    Specific Questions:  General health (excellent/good/fair/poor):  good  Pertinent medical history includes: Migraines/Headaches and Overweight  Medications (nil/NSAIDS/analg/steroids/anticoag/other):  None  Medical allergies:  none  Imaging (none/Xray/MRI/other):  none  Recent or major surgery (yes/no):  no  Night pain (yes/no):  no  Accidents (yes/no):  no  Unexplained weight loss (yes/no):  no  Barriers at home: no  Other red flags: no  Sites for physical examination (back/hip/knee/ankle/foot/other): R ankle    EXAMINATION    Posture:  Sitting (good/fair/poor):     Correction of Posture (better/worse/no effect/NA):   Standing (good/fair/poor): Other observations:      Neurological: (NA/motor/sensory/reflexes/dural):     Baselines (pain or functional activity): R ankle stiffness with full squat, R ankle pain with single-leg squat, less stability R ankle SLS    Extremities (Hip / Knee / Ankle / Foot): R ankle      Movement Loss Carlos Mod Min Nil Pain   Flexion        Extension        Abduction        Adduction        Internal Rotation        External Rotation        Dorsiflexion    x NE   Plantarflexion    x NE   Inversion    x NE   Eversion    x NE         Passive Movement (+/- over pressure)/(PDM/ERP):  Hypermobile all directions B ankles, no pain overpressures R  Resisted Test Response (pain): 5/5 MMT throughout R ankle, no pain  Other Tests: increased laxity lateral ligaments with anterior drawer    Spine:  Movement loss:   Effect of repeated movements:   Effect of static  positioning:   Spine testing (not relevant/relevant/secondary problem):     Baseline Symptoms: R ankle pain with single-leg squat, unsteady SLS  Repeated Tests Symptom Response Mechanical Response   Active/Passive movement, resisted test, functional test During -  Produce, Abolish, Increase, Decrease, NE After -  Better, Worse, NB, NW, NE Effect -   ? or ? ROM, strength or key functional test No   Effect   Repeated R ankle DF with belt OP No Effect    Slightly better? Less stiffness and pain squat    Repeated R ankle DF loaded No Effect    Slightly better Less pain and stiffness squat, improved stability SLS                  Effect of static positioning                Provisional Classification (Extremity/Spine):  Extremity - Derangement and Inconclusive/Other - Structurally Compromised      Princicple of Management:   Education:  POC, treatment rationale, expected response    Equipment provided:  none  Exercise and dosage:  Repeated R ankle DF loaded, 10 reps, 3-4x/day, increase to 5-6x/day as able    ASSESSMENT/PLAN:  Patient is a 30 year old female with right side ankle complaints.  Assessing for derangement.  She has good ROM and no pain with movement.  Strength is good with MMT, but she has weakness noted with SLS.  She had decreased pain and stiffness after repeated DF in a loaded position and will try at home to assess further.    Treatment will focus on directional preference exercises if able to establish in addition to strength and proprioception work to improve stability and overall function.   Patient has the following significant findings with corresponding treatment plan.                Diagnosis 1:  Chronic R ankle pain  Pain -  self management, education, directional preference exercise and home program  Impaired balance - neuro re-education, therapeutic activities and home program  Decreased proprioception - neuro re-education, therapeutic activities and home program  Decreased function -  therapeutic activities and home program      Cumulative Therapy Evaluation is: Low complexity    Previous and current functional limitations:  (See Goal Flow Sheet for this information)    Short term and Long term goals: (See Goal Flow Sheet for this information)   Communication ability:  Patient appears to be able to clearly communicate and understand verbal and written communication and follow directions correctly.  Treatment Explanation - The following has been discussed with the patient:   RX ordered/plan of care  Anticipated outcomes  Possible risks and side effects  This patient would benefit from PT intervention to resume normal activities.   Rehab potential is good.    Frequency:  1 X week, once daily  Duration:  for 6 weeks  Discharge Plan:  Achieve all LTG.  Independent in home treatment program.  Reach maximal therapeutic benefit.    Thank you for your referral.    INQUIRIES  Therapist: Luz Skinner, PT   [facility]  [address] [place] [id number]  Phone: [phone]  Fax: [phone]

## 2020-08-12 ENCOUNTER — THERAPY VISIT (OUTPATIENT)
Dept: PHYSICAL THERAPY | Facility: CLINIC | Age: 30
End: 2020-08-12
Payer: COMMERCIAL

## 2020-08-12 DIAGNOSIS — M25.571 CHRONIC PAIN OF RIGHT ANKLE: ICD-10-CM

## 2020-08-12 DIAGNOSIS — G89.29 CHRONIC PAIN OF RIGHT ANKLE: ICD-10-CM

## 2020-08-12 PROCEDURE — 97140 MANUAL THERAPY 1/> REGIONS: CPT | Mod: GP | Performed by: PHYSICAL THERAPIST

## 2020-08-12 PROCEDURE — 97110 THERAPEUTIC EXERCISES: CPT | Mod: GP | Performed by: PHYSICAL THERAPIST

## 2020-08-26 ENCOUNTER — THERAPY VISIT (OUTPATIENT)
Dept: PHYSICAL THERAPY | Facility: CLINIC | Age: 30
End: 2020-08-26
Payer: COMMERCIAL

## 2020-08-26 DIAGNOSIS — M25.571 CHRONIC PAIN OF RIGHT ANKLE: ICD-10-CM

## 2020-08-26 DIAGNOSIS — G89.29 CHRONIC PAIN OF RIGHT ANKLE: ICD-10-CM

## 2020-08-26 PROCEDURE — 97112 NEUROMUSCULAR REEDUCATION: CPT | Mod: GP | Performed by: PHYSICAL THERAPIST

## 2020-08-26 PROCEDURE — 97140 MANUAL THERAPY 1/> REGIONS: CPT | Mod: GP | Performed by: PHYSICAL THERAPIST

## 2020-08-26 PROCEDURE — 97110 THERAPEUTIC EXERCISES: CPT | Mod: GP | Performed by: PHYSICAL THERAPIST

## 2020-12-27 ENCOUNTER — HEALTH MAINTENANCE LETTER (OUTPATIENT)
Age: 30
End: 2020-12-27

## 2021-10-09 ENCOUNTER — HEALTH MAINTENANCE LETTER (OUTPATIENT)
Age: 31
End: 2021-10-09

## 2022-01-29 ENCOUNTER — HEALTH MAINTENANCE LETTER (OUTPATIENT)
Age: 32
End: 2022-01-29

## 2022-09-17 ENCOUNTER — HEALTH MAINTENANCE LETTER (OUTPATIENT)
Age: 32
End: 2022-09-17

## 2023-05-06 ENCOUNTER — HEALTH MAINTENANCE LETTER (OUTPATIENT)
Age: 33
End: 2023-05-06